# Patient Record
Sex: FEMALE | Race: WHITE | Employment: OTHER | URBAN - NONMETROPOLITAN AREA
[De-identification: names, ages, dates, MRNs, and addresses within clinical notes are randomized per-mention and may not be internally consistent; named-entity substitution may affect disease eponyms.]

---

## 2020-01-16 ENCOUNTER — OFFICE VISIT (OUTPATIENT)
Dept: PRIMARY CARE CLINIC | Age: 75
End: 2020-01-16
Payer: MEDICARE

## 2020-01-16 VITALS
SYSTOLIC BLOOD PRESSURE: 118 MMHG | OXYGEN SATURATION: 92 % | BODY MASS INDEX: 55.32 KG/M2 | HEART RATE: 60 BPM | DIASTOLIC BLOOD PRESSURE: 74 MMHG | HEIGHT: 61 IN | RESPIRATION RATE: 18 BRPM | TEMPERATURE: 96.9 F | WEIGHT: 293 LBS

## 2020-01-16 PROBLEM — I10 BENIGN ESSENTIAL HYPERTENSION: Status: ACTIVE | Noted: 2017-01-20

## 2020-01-16 PROBLEM — I25.10 CORONARY ARTERIOSCLEROSIS IN NATIVE ARTERY: Status: ACTIVE | Noted: 2017-01-20

## 2020-01-16 PROBLEM — E78.2 MIXED HYPERLIPIDEMIA: Status: ACTIVE | Noted: 2017-01-20

## 2020-01-16 PROBLEM — M15.9 GENERALIZED OSTEOARTHRITIS: Status: ACTIVE | Noted: 2017-12-22

## 2020-01-16 PROBLEM — F32.A DEPRESSION: Status: ACTIVE | Noted: 2020-01-16

## 2020-01-16 PROBLEM — G47.33 OBSTRUCTIVE SLEEP APNEA SYNDROME: Status: ACTIVE | Noted: 2017-01-20

## 2020-01-16 PROBLEM — J45.909 ASTHMA: Status: ACTIVE | Noted: 2017-01-20

## 2020-01-16 PROBLEM — N32.81 OVERACTIVE BLADDER: Status: ACTIVE | Noted: 2020-01-16

## 2020-01-16 PROBLEM — I48.91 ATRIAL FIBRILLATION (HCC): Status: ACTIVE | Noted: 2017-01-20

## 2020-01-16 PROCEDURE — 99203 OFFICE O/P NEW LOW 30 MIN: CPT | Performed by: FAMILY MEDICINE

## 2020-01-16 RX ORDER — ISOSORBIDE MONONITRATE 60 MG/1
TABLET, EXTENDED RELEASE ORAL
COMMUNITY
Start: 2019-12-19 | End: 2020-01-16 | Stop reason: SDUPTHER

## 2020-01-16 RX ORDER — SIMVASTATIN 10 MG
10 TABLET ORAL DAILY
Qty: 30 TABLET | Refills: 2 | Status: SHIPPED
Start: 2020-01-16 | End: 2020-04-08

## 2020-01-16 RX ORDER — SOLIFENACIN SUCCINATE 5 MG/1
TABLET, FILM COATED ORAL
COMMUNITY
Start: 2019-12-19 | End: 2020-01-16 | Stop reason: SDUPTHER

## 2020-01-16 RX ORDER — POTASSIUM CHLORIDE 750 MG/1
TABLET, FILM COATED, EXTENDED RELEASE ORAL
COMMUNITY
Start: 2019-12-19 | End: 2020-04-16 | Stop reason: SDUPTHER

## 2020-01-16 RX ORDER — SOLIFENACIN SUCCINATE 5 MG/1
5 TABLET, FILM COATED ORAL DAILY
Qty: 30 TABLET | Refills: 2 | Status: SHIPPED
Start: 2020-01-16 | End: 2020-06-05 | Stop reason: SDUPTHER

## 2020-01-16 RX ORDER — ALBUTEROL SULFATE 2.5 MG/3ML
3 SOLUTION RESPIRATORY (INHALATION)
COMMUNITY
End: 2020-07-21 | Stop reason: SDUPTHER

## 2020-01-16 RX ORDER — MONTELUKAST SODIUM 10 MG/1
TABLET ORAL
COMMUNITY
End: 2020-04-16 | Stop reason: SDUPTHER

## 2020-01-16 RX ORDER — CITALOPRAM 20 MG/1
TABLET ORAL
Qty: 30 TABLET | Refills: 2 | Status: SHIPPED | OUTPATIENT
Start: 2020-01-16 | End: 2020-04-16 | Stop reason: SDUPTHER

## 2020-01-16 RX ORDER — METOPROLOL TARTRATE 100 MG/1
TABLET ORAL
Qty: 60 TABLET | Refills: 2 | Status: SHIPPED | OUTPATIENT
Start: 2020-01-16 | End: 2020-06-05 | Stop reason: SDUPTHER

## 2020-01-16 RX ORDER — APIXABAN 5 MG/1
TABLET, FILM COATED ORAL
Qty: 60 TABLET | Refills: 2 | Status: CANCELLED | OUTPATIENT
Start: 2020-01-16

## 2020-01-16 RX ORDER — SIMVASTATIN 10 MG
TABLET ORAL
COMMUNITY
Start: 2019-12-19 | End: 2020-01-16 | Stop reason: SDUPTHER

## 2020-01-16 RX ORDER — ISOSORBIDE MONONITRATE 60 MG/1
60 TABLET, EXTENDED RELEASE ORAL DAILY
Qty: 30 TABLET | Refills: 2 | Status: SHIPPED
Start: 2020-01-16 | End: 2020-06-05 | Stop reason: SDUPTHER

## 2020-01-16 RX ORDER — APIXABAN 5 MG/1
TABLET, FILM COATED ORAL
COMMUNITY
Start: 2019-11-18 | End: 2020-07-21 | Stop reason: SDUPTHER

## 2020-01-16 RX ORDER — METOPROLOL TARTRATE 100 MG/1
TABLET ORAL
COMMUNITY
End: 2020-01-16 | Stop reason: SDUPTHER

## 2020-01-16 RX ORDER — CITALOPRAM 20 MG/1
TABLET ORAL
COMMUNITY
End: 2020-01-16 | Stop reason: SDUPTHER

## 2020-01-16 ASSESSMENT — PATIENT HEALTH QUESTIONNAIRE - PHQ9
SUM OF ALL RESPONSES TO PHQ9 QUESTIONS 1 & 2: 1
1. LITTLE INTEREST OR PLEASURE IN DOING THINGS: 0
SUM OF ALL RESPONSES TO PHQ QUESTIONS 1-9: 1
2. FEELING DOWN, DEPRESSED OR HOPELESS: 1
DEPRESSION UNABLE TO ASSESS: FUNCTIONAL CAPACITY MOTIVATION LIMITS ACCURACY
SUM OF ALL RESPONSES TO PHQ QUESTIONS 1-9: 1

## 2020-01-16 NOTE — PROGRESS NOTES
5 MG TABS tablet, TK 1 T PO BID, Disp: , Rfl:     montelukast (SINGULAIR) 10 MG tablet, montelukast 10 mg tablet  TAKE (1) TABLET BY MOUTH ONCE DAILY, Disp: , Rfl:     potassium chloride (KLOR-CON) 10 MEQ extended release tablet, , Disp: , Rfl:     isosorbide mononitrate (IMDUR) 60 MG extended release tablet, Take 1 tablet by mouth daily, Disp: 30 tablet, Rfl: 2    metoprolol (LOPRESSOR) 100 MG tablet, metoprolol tartrate 100 mg tablet  Take 1 tablet twice a day by oral route., Disp: 60 tablet, Rfl: 2    citalopram (CELEXA) 20 MG tablet, citalopram 20 mg tablet  TAKE 1 TABLET BY MOUTH EVERY DAY, Disp: 30 tablet, Rfl: 2    simvastatin (ZOCOR) 10 MG tablet, Take 1 tablet by mouth daily, Disp: 30 tablet, Rfl: 2    solifenacin (VESICARE) 5 MG tablet, Take 1 tablet by mouth daily, Disp: 30 tablet, Rfl: 2    Allergies   Allergen Reactions    Aspirin     Codeine        No past medical history on file. Social History     Socioeconomic History    Marital status:       Spouse name: Not on file    Number of children: Not on file    Years of education: Not on file    Highest education level: Not on file   Occupational History    Not on file   Social Needs    Financial resource strain: Not on file    Food insecurity:     Worry: Not on file     Inability: Not on file    Transportation needs:     Medical: Not on file     Non-medical: Not on file   Tobacco Use    Smoking status: Never Smoker    Smokeless tobacco: Never Used   Substance and Sexual Activity    Alcohol use: Not on file    Drug use: Not on file    Sexual activity: Not on file   Lifestyle    Physical activity:     Days per week: Not on file     Minutes per session: Not on file    Stress: Not on file   Relationships    Social connections:     Talks on phone: Not on file     Gets together: Not on file     Attends Gnosticist service: Not on file     Active member of club or organization: Not on file     Attends meetings of clubs or tablet; metoprolol tartrate 100 mg tablet   Take 1 tablet twice a day by oral route. -     citalopram (CELEXA) 20 MG tablet; citalopram 20 mg tablet   TAKE 1 TABLET BY MOUTH EVERY DAY  -     simvastatin (ZOCOR) 10 MG tablet; Take 1 tablet by mouth daily  -     solifenacin (VESICARE) 5 MG tablet; Take 1 tablet by mouth daily      Release of records. Return in about 3 months (around 4/16/2020) for [address], MEDICATION CHECK. Blank Akhtar MD    Note was generated with the assistance of voice recognition software. Document was reviewed however may contain grammatical errors.

## 2020-04-03 ENCOUNTER — TELEPHONE (OUTPATIENT)
Dept: PRIMARY CARE CLINIC | Age: 75
End: 2020-04-03

## 2020-04-03 NOTE — TELEPHONE ENCOUNTER
BECKAI . . . Patient has stopped taking Bydureon.       Electronically signed by Thomasenia Aschoff, LPN on 1/4/97 at 2:57 AM EDT

## 2020-04-08 RX ORDER — SIMVASTATIN 10 MG
10 TABLET ORAL DAILY
Qty: 30 TABLET | Refills: 10 | Status: SHIPPED
Start: 2020-04-08 | End: 2020-06-05 | Stop reason: SDUPTHER

## 2020-04-16 ENCOUNTER — VIRTUAL VISIT (OUTPATIENT)
Dept: PRIMARY CARE CLINIC | Age: 75
End: 2020-04-16
Payer: MEDICARE

## 2020-04-16 PROCEDURE — G2012 BRIEF CHECK IN BY MD/QHP: HCPCS | Performed by: FAMILY MEDICINE

## 2020-04-16 RX ORDER — CITALOPRAM 20 MG/1
TABLET ORAL
Qty: 30 TABLET | Refills: 2 | Status: SHIPPED | OUTPATIENT
Start: 2020-04-16 | End: 2020-06-05 | Stop reason: SDUPTHER

## 2020-04-16 RX ORDER — MONTELUKAST SODIUM 10 MG/1
TABLET ORAL
Qty: 30 TABLET | Refills: 2 | Status: SHIPPED | OUTPATIENT
Start: 2020-04-16 | End: 2020-06-05 | Stop reason: SDUPTHER

## 2020-04-16 RX ORDER — POTASSIUM CHLORIDE 750 MG/1
10 TABLET, FILM COATED, EXTENDED RELEASE ORAL DAILY
Qty: 60 TABLET | Refills: 2 | Status: SHIPPED
Start: 2020-04-16 | End: 2020-06-05

## 2020-04-16 NOTE — PROGRESS NOTES
Daniella Chaudhry is a 75 y.o. female evaluated via telephone on 4/16/2020. Consent:  She and/or health care decision maker is aware that that she may receive a bill for this telephone service, depending on her insurance coverage, and has provided verbal consent to proceed: Yes      Documentation:  I communicated with the patient and/or health care decision maker about hypertension, depression, hyperlipidemia. .   Details of this discussion including any medical advice provided: This 44-year-old female presents today for follow-up evaluation of her chronic medical problems including hypertension, depression and hyperlipidemia. The patient states c she has had a cough for 2 days. The patient states she was recently getting evaluated for her hearing aids and developed a cough. The patient denies any shortness of breath, chest pain or fever. Urrent medication list reviewed. The patient is tolerating all medications well without adverse events or known side effects. The patient is up-to-date on all age-appropriate wellness issues. Assessment #1 hypertension #2 hyperlipidemia #3 depression #4 cough #5 overactive bladder plan for 1 refill medications #2 schedule office visit follow-up in 3 months. #3 the patient was advised to seek care at a local fluid clinic if things progress. I affirm this is a Patient Initiated Episode with an Established Patient who has not had a related appointment within my department in the past 7 days or scheduled within the next 24 hours.     Total Time: minutes: 5-10 minutes    Note: not billable if this call serves to triage the patient into an appointment for the relevant concern      Scott De La O

## 2020-06-05 RX ORDER — CITALOPRAM 20 MG/1
TABLET ORAL
Qty: 30 TABLET | Refills: 2 | Status: SHIPPED | OUTPATIENT
Start: 2020-06-05 | End: 2020-07-21 | Stop reason: SDUPTHER

## 2020-06-05 RX ORDER — METOPROLOL TARTRATE 100 MG/1
TABLET ORAL
Qty: 60 TABLET | Refills: 2 | Status: SHIPPED | OUTPATIENT
Start: 2020-06-05 | End: 2020-07-21 | Stop reason: SDUPTHER

## 2020-06-05 RX ORDER — ISOSORBIDE MONONITRATE 60 MG/1
60 TABLET, EXTENDED RELEASE ORAL DAILY
Qty: 30 TABLET | Refills: 2 | Status: SHIPPED
Start: 2020-06-05 | End: 2020-07-21 | Stop reason: SDUPTHER

## 2020-06-05 RX ORDER — POTASSIUM CHLORIDE 750 MG/1
10 TABLET, FILM COATED, EXTENDED RELEASE ORAL DAILY
Qty: 60 TABLET | Refills: 2 | Status: SHIPPED
Start: 2020-06-05 | End: 2020-07-21 | Stop reason: SDUPTHER

## 2020-06-05 RX ORDER — SOLIFENACIN SUCCINATE 5 MG/1
5 TABLET, FILM COATED ORAL DAILY
Qty: 30 TABLET | Refills: 2 | Status: SHIPPED
Start: 2020-06-05 | End: 2020-06-09

## 2020-06-05 RX ORDER — MONTELUKAST SODIUM 10 MG/1
TABLET ORAL
Qty: 30 TABLET | Refills: 2 | Status: SHIPPED | OUTPATIENT
Start: 2020-06-05 | End: 2020-07-21 | Stop reason: SDUPTHER

## 2020-06-05 RX ORDER — SIMVASTATIN 10 MG
10 TABLET ORAL DAILY
Qty: 30 TABLET | Refills: 10 | Status: SHIPPED
Start: 2020-06-05 | End: 2020-07-21 | Stop reason: SDUPTHER

## 2020-06-09 RX ORDER — SOLIFENACIN SUCCINATE 5 MG/1
5 TABLET, FILM COATED ORAL DAILY
Qty: 30 TABLET | Refills: 10 | Status: SHIPPED
Start: 2020-06-09 | End: 2020-07-21 | Stop reason: SDUPTHER

## 2020-06-26 ENCOUNTER — TELEPHONE (OUTPATIENT)
Dept: ADMINISTRATIVE | Age: 75
End: 2020-06-26

## 2020-07-21 RX ORDER — APIXABAN 5 MG/1
TABLET, FILM COATED ORAL
Qty: 60 TABLET | Refills: 3 | Status: SHIPPED
Start: 2020-07-21 | End: 2020-11-02 | Stop reason: SDUPTHER

## 2020-07-21 RX ORDER — MONTELUKAST SODIUM 10 MG/1
TABLET ORAL
Qty: 30 TABLET | Refills: 2 | Status: SHIPPED | OUTPATIENT
Start: 2020-07-21 | End: 2020-07-24 | Stop reason: SDUPTHER

## 2020-07-21 RX ORDER — ISOSORBIDE MONONITRATE 60 MG/1
60 TABLET, EXTENDED RELEASE ORAL DAILY
Qty: 30 TABLET | Refills: 2 | Status: SHIPPED
Start: 2020-07-21 | End: 2020-11-02 | Stop reason: SDUPTHER

## 2020-07-21 RX ORDER — SIMVASTATIN 10 MG
10 TABLET ORAL DAILY
Qty: 30 TABLET | Refills: 10 | Status: SHIPPED
Start: 2020-07-21 | End: 2021-02-23 | Stop reason: SDUPTHER

## 2020-07-21 RX ORDER — METOPROLOL TARTRATE 100 MG/1
TABLET ORAL
Qty: 60 TABLET | Refills: 2 | Status: SHIPPED | OUTPATIENT
Start: 2020-07-21 | End: 2020-07-24 | Stop reason: SDUPTHER

## 2020-07-21 RX ORDER — SOLIFENACIN SUCCINATE 5 MG/1
5 TABLET, FILM COATED ORAL DAILY
Qty: 30 TABLET | Refills: 10 | Status: SHIPPED
Start: 2020-07-21 | End: 2021-02-23 | Stop reason: SDUPTHER

## 2020-07-21 RX ORDER — CITALOPRAM 20 MG/1
TABLET ORAL
Qty: 30 TABLET | Refills: 2 | Status: SHIPPED | OUTPATIENT
Start: 2020-07-21 | End: 2020-07-24 | Stop reason: SDUPTHER

## 2020-07-21 RX ORDER — ALBUTEROL SULFATE 2.5 MG/3ML
2.5 SOLUTION RESPIRATORY (INHALATION) EVERY 4 HOURS PRN
Qty: 120 EACH | Refills: 3 | Status: SHIPPED
Start: 2020-07-21 | End: 2020-11-04

## 2020-07-21 RX ORDER — POTASSIUM CHLORIDE 750 MG/1
10 TABLET, FILM COATED, EXTENDED RELEASE ORAL DAILY
Qty: 60 TABLET | Refills: 2 | Status: SHIPPED
Start: 2020-07-21 | End: 2021-02-23 | Stop reason: SDUPTHER

## 2020-07-22 ENCOUNTER — VIRTUAL VISIT (OUTPATIENT)
Dept: PRIMARY CARE CLINIC | Age: 75
End: 2020-07-22
Payer: MEDICARE

## 2020-07-22 PROCEDURE — G8400 PT W/DXA NO RESULTS DOC: HCPCS | Performed by: FAMILY MEDICINE

## 2020-07-22 PROCEDURE — 99214 OFFICE O/P EST MOD 30 MIN: CPT | Performed by: FAMILY MEDICINE

## 2020-07-22 PROCEDURE — 3017F COLORECTAL CA SCREEN DOC REV: CPT | Performed by: FAMILY MEDICINE

## 2020-07-22 PROCEDURE — 1090F PRES/ABSN URINE INCON ASSESS: CPT | Performed by: FAMILY MEDICINE

## 2020-07-22 PROCEDURE — G8427 DOCREV CUR MEDS BY ELIG CLIN: HCPCS | Performed by: FAMILY MEDICINE

## 2020-07-22 PROCEDURE — 1123F ACP DISCUSS/DSCN MKR DOCD: CPT | Performed by: FAMILY MEDICINE

## 2020-07-22 PROCEDURE — 4040F PNEUMOC VAC/ADMIN/RCVD: CPT | Performed by: FAMILY MEDICINE

## 2020-07-22 RX ORDER — METOLAZONE 5 MG/1
TABLET ORAL
COMMUNITY
Start: 2020-05-19

## 2020-07-22 NOTE — PROGRESS NOTES
2020    TELEHEALTH EVALUATION -- Audio/Visual (During  public health emergency)    HPI: This 65-year-old female presents today for follow-up evaluation of her chronic medical problems. Current medication list reviewed. The patient is tolerating all medications well without adverse events or known side effects. The patient is not up-to-date on all age-appropriate wellness issues. The patient states last month she fell at home injuring her posterior scalp region as well as her knee. Kristinnarda Goldstein (:  1945) has requested an audio/video evaluation for the following concern(s): Evaluation management of chronic medical problems. Review of Systems negative unless otherwise noted in HPI. Prior to Visit Medications    Medication Sig Taking? Authorizing Provider   ELIQUIS 5 MG TABS tablet TK 1 T PO BID Yes MARIAM Cronin MD   metoprolol (LOPRESSOR) 100 MG tablet metoprolol tartrate 100 mg tablet   Take 1 tablet twice a day by oral route.  Yes Aristeo Mccall MD   potassium chloride (KLOR-CON) 10 MEQ extended release tablet Take 1 tablet by mouth daily Yes Aristeo Mccall MD   solifenacin (VESICARE) 5 MG tablet Take 1 tablet by mouth daily Yes Aristeo Mccall MD   simvastatin (ZOCOR) 10 MG tablet Take 1 tablet by mouth daily Yes Aristeo Mccall MD   montelukast (SINGULAIR) 10 MG tablet montelukast 10 mg tablet   TAKE (1) TABLET BY MOUTH ONCE DAILY Yes MARIAM Cronin MD   isosorbide mononitrate (IMDUR) 60 MG extended release tablet Take 1 tablet by mouth daily Yes Aristeo Mccall MD   citalopram (CELEXA) 20 MG tablet citalopram 20 mg tablet   TAKE 1 TABLET BY MOUTH EVERY DAY Yes Aristeo Mccall MD   albuterol (PROVENTIL) (2.5 MG/3ML) 0.083% nebulizer solution Take 3 mLs by nebulization every 4 hours as needed for Wheezing Yes Aritseo Mcacll MD   metOLazone (ZAROXOLYN) 5 MG tablet   Historical Provider, MD       Social History     Tobacco Use    Smoking status: Never Smoker    Smokeless tobacco: Never Used   Substance Use Topics    Alcohol use: Not on file    Drug use: Not on file            PHYSICAL EXAMINATION:  [ INSTRUCTIONS:  \"[x]\" Indicates a positive item  \"[]\" Indicates a negative item  -- DELETE ALL ITEMS NOT EXAMINED]  Vital Signs: (As obtained by patient/caregiver or practitioner observation)    Blood pressure-  Heart rate-    Respiratory rate-    Temperature-  Pulse oximetry-     Constitutional: [x] Appears well-developed and well-nourished [x] No apparent distress      [] Abnormal-   Mental status  [x] Alert and awake  [x] Oriented to person/place/time [x]Able to follow commands      Eyes:  EOM    [x]  Normal  [] Abnormal-  Sclera  [x]  Normal  [] Abnormal -         Discharge [x]  None visible  [] Abnormal -    HENT:   [x] Normocephalic, atraumatic. [] Abnormal   [x] Mouth/Throat: Mucous membranes are moist.     External Ears [x] Normal  [] Abnormal-     Neck: [x] No visualized mass     Pulmonary/Chest: [x] Respiratory effort normal.  [x] No visualized signs of difficulty breathing or respiratory distress        [] Abnormal-      Musculoskeletal:   [] Normal gait with no signs of ataxia         [] Normal range of motion of neck        [] Abnormal-       Neurological:        [x] No Facial Asymmetry (Cranial nerve 7 motor function) (limited exam to video visit)          [x] No gaze palsy        [] Abnormal-         Skin:        [x] No significant exanthematous lesions or discoloration noted on facial skin         [] Abnormal-            Psychiatric:       [x] Normal Affect [x] No Hallucinations        [] Abnormal-     Other pertinent observable physical exam findings-     ASSESSMENT/PLAN: Assessment #1 hypertension #2  Depression #3 hyperlipidemia #4 LAD plan #1 continue current medical therapy #2 schedule office visit follow-up in 3 months. Return in about 3 months (around 10/22/2020) for MEDICATION CHECK, FOLLOW UP [address] Kristin Goldstein is a 75 y.o. female being evaluated by a Virtual Visit (video visit) encounter to address concerns as mentioned above. A caregiver was present when appropriate. Due to this being a TeleHealth encounter (During ZHB-90 public health emergency), evaluation of the following organ systems was limited: Vitals/Constitutional/EENT/Resp/CV/GI//MS/Neuro/Skin/Heme-Lymph-Imm. Pursuant to the emergency declaration under the [address] [place] [address], [address] [place] [address] and the [employer] and [employer] Act, this Virtual Visit was conducted with patient's (and/or legal guardian's) consent, to reduce the patient's risk of exposure to COVID-19 and provide necessary medical care. The patient (and/or legal guardian) has also been advised to contact this office for worsening conditions or problems, and seek emergency medical treatment and/or call 911 if deemed necessary. This encounter was conducted via [employer]. Patient identification was verified at the start of the visit: Yes    Total time spent on this encounter: 25 minutes. Services were provided through a video synchronous discussion virtually to substitute for in-person clinic visit. Patient and provider were located at their individual homes. --Ankita Roach MD on 7/22/2020 at 2:41 PM    An electronic signature was used to authenticate this note.

## 2020-07-24 RX ORDER — METOPROLOL TARTRATE 100 MG/1
TABLET ORAL
Qty: 60 TABLET | Refills: 2 | Status: SHIPPED | OUTPATIENT
Start: 2020-07-24 | End: 2020-07-29 | Stop reason: SDUPTHER

## 2020-07-24 RX ORDER — CITALOPRAM 20 MG/1
TABLET ORAL
Qty: 30 TABLET | Refills: 2 | Status: SHIPPED | OUTPATIENT
Start: 2020-07-24 | End: 2020-07-29 | Stop reason: SDUPTHER

## 2020-07-24 RX ORDER — MONTELUKAST SODIUM 10 MG/1
TABLET ORAL
Qty: 30 TABLET | Refills: 2 | Status: SHIPPED | OUTPATIENT
Start: 2020-07-24 | End: 2020-07-29 | Stop reason: SDUPTHER

## 2020-07-24 NOTE — TELEPHONE ENCOUNTER
Last Appointment:  1/16/2020  No future appointments. Patient needs pended med refilled.     Electronically signed by Monika Myers LPN on 7/32/8786 at 4:13 PM

## 2020-07-29 RX ORDER — MONTELUKAST SODIUM 10 MG/1
TABLET ORAL
Qty: 30 TABLET | Refills: 2 | Status: SHIPPED | OUTPATIENT
Start: 2020-07-29 | End: 2020-08-17 | Stop reason: SDUPTHER

## 2020-07-29 RX ORDER — METOPROLOL TARTRATE 100 MG/1
TABLET ORAL
Qty: 60 TABLET | Refills: 2 | Status: SHIPPED | OUTPATIENT
Start: 2020-07-29 | End: 2020-08-17 | Stop reason: SDUPTHER

## 2020-07-29 RX ORDER — CITALOPRAM 20 MG/1
TABLET ORAL
Qty: 30 TABLET | Refills: 2 | Status: SHIPPED | OUTPATIENT
Start: 2020-07-29 | End: 2021-02-23 | Stop reason: SDUPTHER

## 2020-07-30 ENCOUNTER — TELEPHONE (OUTPATIENT)
Dept: PRIMARY CARE CLINIC | Age: 75
End: 2020-07-30

## 2020-07-30 NOTE — TELEPHONE ENCOUNTER
Last Appointment:  1/16/2020  No future appointments. Patient left message to disregard the paperwork for her apartment at this time. Landlord will send over new paperwork to be completed.     Electronically signed by Nic Denton LPN on 3/00/4837 at 7:46 PM

## 2020-08-17 ENCOUNTER — OFFICE VISIT (OUTPATIENT)
Dept: PRIMARY CARE CLINIC | Age: 75
End: 2020-08-17
Payer: MEDICARE

## 2020-08-17 ENCOUNTER — HOSPITAL ENCOUNTER (OUTPATIENT)
Age: 75
Discharge: HOME | End: 2020-08-19
Payer: MEDICARE

## 2020-08-17 VITALS
OXYGEN SATURATION: 92 % | SYSTOLIC BLOOD PRESSURE: 136 MMHG | RESPIRATION RATE: 16 BRPM | HEART RATE: 69 BPM | BODY MASS INDEX: 55.32 KG/M2 | WEIGHT: 293 LBS | DIASTOLIC BLOOD PRESSURE: 80 MMHG | HEIGHT: 61 IN

## 2020-08-17 LAB
ALBUMIN SERPL-MCNC: 3.6 G/DL (ref 3.5–5.2)
ALP BLD-CCNC: 78 U/L (ref 35–104)
ALT SERPL-CCNC: 7 U/L (ref 0–32)
ANION GAP SERPL CALCULATED.3IONS-SCNC: 13 MMOL/L (ref 7–16)
AST SERPL-CCNC: 16 U/L (ref 0–31)
BILIRUB SERPL-MCNC: 0.3 MG/DL (ref 0–1.2)
BUN BLDV-MCNC: 20 MG/DL (ref 8–23)
CALCIUM SERPL-MCNC: 9.4 MG/DL (ref 8.6–10.2)
CHLORIDE BLD-SCNC: 98 MMOL/L (ref 98–107)
CHOLESTEROL, TOTAL: 162 MG/DL (ref 0–199)
CO2: 32 MMOL/L (ref 22–29)
CREAT SERPL-MCNC: 1.4 MG/DL (ref 0.5–1)
GFR AFRICAN AMERICAN: 44
GFR NON-AFRICAN AMERICAN: 37 ML/MIN/1.73
GLUCOSE BLD-MCNC: 103 MG/DL (ref 74–99)
HDLC SERPL-MCNC: 80 MG/DL
LDL CHOLESTEROL CALCULATED: 64 MG/DL (ref 0–99)
POTASSIUM SERPL-SCNC: 5 MMOL/L (ref 3.5–5)
SODIUM BLD-SCNC: 143 MMOL/L (ref 132–146)
TOTAL PROTEIN: 7 G/DL (ref 6.4–8.3)
TRIGL SERPL-MCNC: 91 MG/DL (ref 0–149)
VLDLC SERPL CALC-MCNC: 18 MG/DL

## 2020-08-17 PROCEDURE — 80053 COMPREHEN METABOLIC PANEL: CPT

## 2020-08-17 PROCEDURE — G8400 PT W/DXA NO RESULTS DOC: HCPCS | Performed by: FAMILY MEDICINE

## 2020-08-17 PROCEDURE — 3017F COLORECTAL CA SCREEN DOC REV: CPT | Performed by: FAMILY MEDICINE

## 2020-08-17 PROCEDURE — 99214 OFFICE O/P EST MOD 30 MIN: CPT | Performed by: FAMILY MEDICINE

## 2020-08-17 PROCEDURE — G8427 DOCREV CUR MEDS BY ELIG CLIN: HCPCS | Performed by: FAMILY MEDICINE

## 2020-08-17 PROCEDURE — 36415 COLL VENOUS BLD VENIPUNCTURE: CPT

## 2020-08-17 PROCEDURE — 1123F ACP DISCUSS/DSCN MKR DOCD: CPT | Performed by: FAMILY MEDICINE

## 2020-08-17 PROCEDURE — 4040F PNEUMOC VAC/ADMIN/RCVD: CPT | Performed by: FAMILY MEDICINE

## 2020-08-17 PROCEDURE — G8417 CALC BMI ABV UP PARAM F/U: HCPCS | Performed by: FAMILY MEDICINE

## 2020-08-17 PROCEDURE — 1036F TOBACCO NON-USER: CPT | Performed by: FAMILY MEDICINE

## 2020-08-17 PROCEDURE — 1090F PRES/ABSN URINE INCON ASSESS: CPT | Performed by: FAMILY MEDICINE

## 2020-08-17 PROCEDURE — 80061 LIPID PANEL: CPT

## 2020-08-17 RX ORDER — MONTELUKAST SODIUM 10 MG/1
TABLET ORAL
Qty: 90 TABLET | Refills: 2 | Status: SHIPPED | OUTPATIENT
Start: 2020-08-17 | End: 2021-02-23 | Stop reason: SDUPTHER

## 2020-08-17 RX ORDER — METOPROLOL TARTRATE 100 MG/1
TABLET ORAL
Qty: 60 TABLET | Refills: 2 | Status: SHIPPED | OUTPATIENT
Start: 2020-08-17 | End: 2020-11-02 | Stop reason: SDUPTHER

## 2020-08-17 NOTE — PROGRESS NOTES
2020     [facility]    : 1945 Sex: female   Age: 75 y.o. Chief Complaint   Patient presents with    Hypertension       HPI: This 75y.o. -year-old female  presents today for evaluation and management of her  chronic medical problems. Current medication list reviewed. The patient is tolerating all medications well without adverse events or known side effects. The patient does understand the risk and benefits of the prescribed medications. The patient is up-to-date on all age-appropriate wellness issues. ROS:   Const: Denies changes in appetite, chills, fever, night sweats and weight loss. Eyes:  Denies discharge, a recent change in visual acuity, blurred vision and double vision. ENMT: Denies discharge of the ears, hearing loss, pain of the ears. Denies nasal or sinus symptoms other than stated above. Denies mouth or throat symptoms. CV:  Denies chest pain, dyspnea on exertion, orthopnea, palpitations and PND  Resp: Denies chest pain, cough, SOB and wheezing. GI: Denies abdominal pain, constipation, diarrhea, heartburn, indigestion, nausea and vomiting. : Denies dysuria, frequency, hematuria, nocturia and urgency. Musculo: Denies arthralgias and myalgia  Skin:  Denies lesions, pruritus and rash. Neuro: Denies dizziness, lightheadedness, numbness, tingling and weakness. Psych:  Denies anxiety and depression  Endocrine: Denies anxiety and depression. Hema/Lymph: Denies hematologic symptoms  Allergy/Immuno:  Denies allergic/immunologic symptoms.   Pertinent positives reviewed and noted      Current Outpatient Medications:     montelukast (SINGULAIR) 10 MG tablet, montelukast 10 mg tablet  TAKE (1) TABLET BY MOUTH ONCE DAILY, Disp: 90 tablet, Rfl: 2    metoprolol (LOPRESSOR) 100 MG tablet, metoprolol tartrate 100 mg tablet  Take 1 tablet twice a day by oral route., Disp: 60 tablet, Rfl: 2    citalopram (CELEXA) 20 MG tablet, citalopram 20 mg tablet  TAKE 1 TABLET BY MOUTH EVERY partner violence     Fear of current or ex partner: Not on file     Emotionally abused: Not on file     Physically abused: Not on file     Forced sexual activity: Not on file   Other Topics Concern    Not on file   Social History Narrative    Not on file     History reviewed. No pertinent surgical history. History reviewed. No pertinent family history. Vitals:    08/17/20 1111   BP: 136/80   Pulse: 69   Resp: 16   SpO2: 92%   Weight: (!) 323 lb (146.5 kg)   Height: 5' 1\" (1.549 m)        Exam: Const: Appears healthy and well developed. No signs of acute distress present. Eyes: PERRL  ENMT: Tympanic membranes are intact. Nasal mucosa intact without noted erythema Septum is in the midline. Posterior pharynx shows no exudate, irritation or redness. Neck:  Supple without adenopathy. Adequate range of motion   Resp: No rales, rhonchi, wheezes appreciated over the lungs bilaterally. CV: S1, S2 within normal limits. Regular rate and rhythm noted. Without murmur, gallop or rub. Extremities:  Pulses intact. Without noted edema. Abdomen: Positive bowel sounds. Palpation reveals softness, with no distension, organomegaly or tenderness. No abdominal masses palpable. Skin: Skin is warm and dry. Musculo: Unchanged upon examination. Neuro: Alert and oriented X3. Cranial nerves grossly intact. Hearing aids intact. Psych: Mood is normal.  Affect is normal.   Vital signs reviewed. Controlled Substances Monitoring:     No flowsheet data found. Plan Per Assessment:  Delphine Pressley was seen today for hypertension. Diagnoses and all orders for this visit:    Benign essential hypertension  -     Comprehensive Metabolic Panel; Future  -     Lipid Panel; Future    Mixed hyperlipidemia  -     Lipid Panel; Future    Overactive bladder    Depression, unspecified depression type    Encounter for screening for malignant neoplasm of rectum  -     POCT Fecal Immunochemical Test (FIT);  Future    Other orders  - montelukast (SINGULAIR) 10 MG tablet; montelukast 10 mg tablet   TAKE (1) TABLET BY MOUTH ONCE DAILY  -     metoprolol (LOPRESSOR) 100 MG tablet; metoprolol tartrate 100 mg tablet   Take 1 tablet twice a day by oral route. Return in about 4 weeks (around 9/14/2020) for Wellness examination. Wu Ladd MD    Note was generated with the assistance of voice recognition software. Document was reviewed however may contain grammatical errors.

## 2020-08-18 ENCOUNTER — TELEPHONE (OUTPATIENT)
Dept: PRIMARY CARE CLINIC | Age: 75
End: 2020-08-18

## 2020-09-21 ENCOUNTER — TELEPHONE (OUTPATIENT)
Dept: PRIMARY CARE CLINIC | Age: 75
End: 2020-09-21

## 2020-11-02 RX ORDER — METOPROLOL TARTRATE 100 MG/1
TABLET ORAL
Qty: 60 TABLET | Refills: 5 | Status: SHIPPED | OUTPATIENT
Start: 2020-11-02 | End: 2021-02-10 | Stop reason: SDUPTHER

## 2020-11-02 RX ORDER — APIXABAN 5 MG/1
TABLET, FILM COATED ORAL
Qty: 60 TABLET | Refills: 5 | Status: SHIPPED
Start: 2020-11-02 | End: 2021-02-23 | Stop reason: SDUPTHER

## 2020-11-02 RX ORDER — ISOSORBIDE MONONITRATE 60 MG/1
60 TABLET, EXTENDED RELEASE ORAL DAILY
Qty: 30 TABLET | Refills: 5 | Status: SHIPPED
Start: 2020-11-02 | End: 2021-02-23 | Stop reason: SDUPTHER

## 2020-11-02 NOTE — TELEPHONE ENCOUNTER
Last Appointment:  8/17/2020  Future Appointments   Date Time Provider Waleska Frias   11/16/2020 11:30 AM Jyotsna Carr MD [facility]   11/16/2020 12:50 PM Jyotsna Carr MD [address] [place] [address]

## 2020-11-04 RX ORDER — ALBUTEROL SULFATE 2.5 MG/3ML
SOLUTION RESPIRATORY (INHALATION)
Qty: 360 ML | Refills: 10 | Status: SHIPPED | OUTPATIENT
Start: 2020-11-04

## 2020-11-16 ENCOUNTER — VIRTUAL VISIT (OUTPATIENT)
Dept: PRIMARY CARE CLINIC | Age: 75
End: 2020-11-16
Payer: MEDICARE

## 2020-11-16 PROCEDURE — 1123F ACP DISCUSS/DSCN MKR DOCD: CPT | Performed by: FAMILY MEDICINE

## 2020-11-16 PROCEDURE — 4040F PNEUMOC VAC/ADMIN/RCVD: CPT | Performed by: FAMILY MEDICINE

## 2020-11-16 PROCEDURE — 99213 OFFICE O/P EST LOW 20 MIN: CPT | Performed by: FAMILY MEDICINE

## 2020-11-16 PROCEDURE — G8400 PT W/DXA NO RESULTS DOC: HCPCS | Performed by: FAMILY MEDICINE

## 2020-11-16 PROCEDURE — G8427 DOCREV CUR MEDS BY ELIG CLIN: HCPCS | Performed by: FAMILY MEDICINE

## 2020-11-16 PROCEDURE — 1090F PRES/ABSN URINE INCON ASSESS: CPT | Performed by: FAMILY MEDICINE

## 2020-11-16 NOTE — PROGRESS NOTES
2020    TELEHEALTH EVALUATION -- Audio/Visual (During KIAWR-32 public health emergency)    HPI: This 50-year-old female presents today with a chief complaint of interscapular pain. The patient states the other night she experienced the pain. Patient states she was having some neck pain at the same time. The patient denied any chest pain, shortness of breath or lightheadedness/dizziness. Current medication list reviewed. The patient is tolerating all medications well without adverse events or known side effects. The patient is not up-to-date on all age-appropriate wellness issues. Denia Mathews (:  1945) has requested an audio/video evaluation for the following concern(s): Intrascapular pain. Review of Systems negative unless otherwise noted in HPI. Prior to Visit Medications    Medication Sig Taking? Authorizing Provider   albuterol (PROVENTIL) (2.5 MG/3ML) 0.083% nebulizer solution INHALE 3ML BY NEBULIZATION EVERY 4 HOURS AS NEEDED FOR WHEEZING  MARIAM Coulter MD   isosorbide mononitrate (IMDUR) 60 MG extended release tablet Take 1 tablet by mouth daily  Hardy Pang MD   ELIQUIS 5 MG TABS tablet TK 1 T PO BID  MARIAM Coulter MD   metoprolol (LOPRESSOR) 100 MG tablet metoprolol tartrate 100 mg tablet   Take 1 tablet twice a day by oral route.   Hardy Pang MD   montelukast (SINGULAIR) 10 MG tablet montelukast 10 mg tablet   TAKE (1) TABLET BY MOUTH ONCE DAILY  MARIAM Coulter MD   citalopram (CELEXA) 20 MG tablet citalopram 20 mg tablet   TAKE 1 TABLET BY MOUTH EVERY DAY  MARIAM Coulter MD   metOLazone (ZAROXOLYN) 5 MG tablet   Historical Provider, MD   potassium chloride (KLOR-CON) 10 MEQ extended release tablet Take 1 tablet by mouth daily  Hardy Pang MD   solifenacin (VESICARE) 5 MG tablet Take 1 tablet by mouth daily  Hardy Pang MD   simvastatin (ZOCOR) 10 MG tablet Take 1 tablet by mouth daily  Hardy Pang MD       Social History     Tobacco Use    Smoking status: Never Smoker    Smokeless tobacco: Never Used   Substance Use Topics    Alcohol use: Not on file    Drug use: Not on file            PHYSICAL EXAMINATION:  [ INSTRUCTIONS:  \"[x]\" Indicates a positive item  \"[]\" Indicates a negative item  -- DELETE ALL ITEMS NOT EXAMINED]  Vital Signs: (As obtained by patient/caregiver or practitioner observation)    Blood pressure-  Heart rate-    Respiratory rate-    Temperature-  Pulse oximetry-     Constitutional: [x] Appears well-developed and well-nourished [x] No apparent distress      [] Abnormal-   Mental status  [x] Alert and awake  [x] Oriented to person/place/time [x]Able to follow commands      Eyes:  EOM    [x]  Normal  [] Abnormal-  Sclera  [x]  Normal  [] Abnormal -         Discharge [x]  None visible  [] Abnormal -    HENT:   [x] Normocephalic, atraumatic. [] Abnormal   [x] Mouth/Throat: Mucous membranes are moist.     External Ears [x] Normal  [] Abnormal-     Neck: [x] No visualized mass     Pulmonary/Chest: [x] Respiratory effort normal.  [x] No visualized signs of difficulty breathing or respiratory distress        [] Abnormal-      Musculoskeletal:   [] Normal gait with no signs of ataxia         [] Normal range of motion of neck        [] Abnormal-       Neurological:        [x] No Facial Asymmetry (Cranial nerve 7 motor function) (limited exam to video visit)          [x] No gaze palsy        [] Abnormal-         Skin:        [x] No significant exanthematous lesions or discoloration noted on facial skin         [] Abnormal-            Psychiatric:       [x] Normal Affect [] No Hallucinations        [] Abnormal-     Other pertinent observable physical exam findings-     ASSESSMENT/PLAN: #1 scapular pain. Plan #1 continue current medical therapy #2 the patient was advised if any recurrence of pain to contact the office. #3 annual wellness visit in 3 weeks. Return in about 3 weeks (around 12/7/2020) for Annual wellness visit. Malgorzata Marx is a 67 y.o. female being evaluated by a Virtual Visit (video visit) encounter to address concerns as mentioned above. A caregiver was present when appropriate. Due to this being a TeleHealth encounter (During ZGGJU-52 public health emergency), evaluation of the following organ systems was limited: Vitals/Constitutional/EENT/Resp/CV/GI//MS/Neuro/Skin/Heme-Lymph-Imm. Pursuant to the emergency declaration under the [address] [place] [address], [address] [place] [address] authority and the [employer] Act, this Virtual Visit was conducted with patient's (and/or legal guardian's) consent, to reduce the patient's risk of exposure to COVID-19 and provide necessary medical care. The patient (and/or legal guardian) has also been advised to contact this office for worsening conditions or problems, and seek emergency medical treatment and/or call 911 if deemed necessary. This encounter was conducted via [employer]. Patient identification was verified at the start of the visit: Yes    Total time spent on this encounter: 15 minutes    Services were provided through a video synchronous discussion virtually to substitute for in-person clinic visit. Patient and provider were located at their individual homes. --Ld Andre MD on 11/16/2020 at 8:40 AM    An electronic signature was used to authenticate this note.

## 2020-11-20 LAB
CONTROL: NORMAL
HEMOCCULT STL QL: NORMAL

## 2020-11-20 PROCEDURE — 82274 ASSAY TEST FOR BLOOD FECAL: CPT | Performed by: FAMILY MEDICINE

## 2020-12-07 ENCOUNTER — VIRTUAL VISIT (OUTPATIENT)
Dept: PRIMARY CARE CLINIC | Age: 75
End: 2020-12-07
Payer: MEDICARE

## 2020-12-07 VITALS
HEART RATE: 99 BPM | BODY MASS INDEX: 55.32 KG/M2 | WEIGHT: 293 LBS | HEIGHT: 61 IN | DIASTOLIC BLOOD PRESSURE: 94 MMHG | SYSTOLIC BLOOD PRESSURE: 157 MMHG

## 2020-12-07 PROCEDURE — G0439 PPPS, SUBSEQ VISIT: HCPCS | Performed by: FAMILY MEDICINE

## 2020-12-07 PROCEDURE — G8484 FLU IMMUNIZE NO ADMIN: HCPCS | Performed by: FAMILY MEDICINE

## 2020-12-07 PROCEDURE — 4040F PNEUMOC VAC/ADMIN/RCVD: CPT | Performed by: FAMILY MEDICINE

## 2020-12-07 PROCEDURE — 1123F ACP DISCUSS/DSCN MKR DOCD: CPT | Performed by: FAMILY MEDICINE

## 2020-12-07 ASSESSMENT — PATIENT HEALTH QUESTIONNAIRE - PHQ9
8. MOVING OR SPEAKING SO SLOWLY THAT OTHER PEOPLE COULD HAVE NOTICED. OR THE OPPOSITE, BEING SO FIGETY OR RESTLESS THAT YOU HAVE BEEN MOVING AROUND A LOT MORE THAN USUAL: 0
4. FEELING TIRED OR HAVING LITTLE ENERGY: 1
SUM OF ALL RESPONSES TO PHQ QUESTIONS 1-9: 9
9. THOUGHTS THAT YOU WOULD BE BETTER OFF DEAD, OR OF HURTING YOURSELF: 0
5. POOR APPETITE OR OVEREATING: 2
7. TROUBLE CONCENTRATING ON THINGS, SUCH AS READING THE NEWSPAPER OR WATCHING TELEVISION: 0
3. TROUBLE FALLING OR STAYING ASLEEP: 2
SUM OF ALL RESPONSES TO PHQ9 QUESTIONS 1 & 2: 4
6. FEELING BAD ABOUT YOURSELF - OR THAT YOU ARE A FAILURE OR HAVE LET YOURSELF OR YOUR FAMILY DOWN: 0
10. IF YOU CHECKED OFF ANY PROBLEMS, HOW DIFFICULT HAVE THESE PROBLEMS MADE IT FOR YOU TO DO YOUR WORK, TAKE CARE OF THINGS AT HOME, OR GET ALONG WITH OTHER PEOPLE: 1
2. FEELING DOWN, DEPRESSED OR HOPELESS: 2
SUM OF ALL RESPONSES TO PHQ QUESTIONS 1-9: 9
SUM OF ALL RESPONSES TO PHQ QUESTIONS 1-9: 9
1. LITTLE INTEREST OR PLEASURE IN DOING THINGS: 2

## 2020-12-07 ASSESSMENT — LIFESTYLE VARIABLES: HOW OFTEN DO YOU HAVE A DRINK CONTAINING ALCOHOL: 0

## 2020-12-07 NOTE — PROGRESS NOTES
Medicare Annual Wellness Visit  Name: Jacobo Cedillo Date: 2020   MRN: <[id number]> Sex: Female   Age: 67 y.o. Ethnicity: Non-/Non    : 1945 Race: Sarah Fitzgerald is here for Medicare AWV and Hypertension    Screenings for behavioral, psychosocial and functional/safety risks, and cognitive dysfunction are all negative except as indicated below. These results, as well as other patient data from the [address] form, are documented in Flowsheets linked to this Encounter. Allergies   Allergen Reactions    Aspirin     Codeine          Prior to Visit Medications    Medication Sig Taking? Authorizing Provider   albuterol (PROVENTIL) (2.5 MG/3ML) 0.083% nebulizer solution INHALE 3ML BY NEBULIZATION EVERY 4 HOURS AS NEEDED FOR WHEEZING Yes Madonna Bazan MD   ELIQUIS 5 MG TABS tablet TK 1 T PO BID Yes MARIAM Guzman MD   metoprolol (LOPRESSOR) 100 MG tablet metoprolol tartrate 100 mg tablet   Take 1 tablet twice a day by oral route. Yes Madonna Bazan MD   montelukast (SINGULAIR) 10 MG tablet montelukast 10 mg tablet   TAKE (1) TABLET BY MOUTH ONCE DAILY Yes Madonna Bazan MD   citalopram (CELEXA) 20 MG tablet citalopram 20 mg tablet   TAKE 1 TABLET BY MOUTH EVERY DAY Yes Madonna Bazan MD   metOLazone (ZAROXOLYN) 5 MG tablet  Yes Historical Provider, MD   solifenacin (VESICARE) 5 MG tablet Take 1 tablet by mouth daily Yes Madonna Bazan MD   simvastatin (ZOCOR) 10 MG tablet Take 1 tablet by mouth daily Yes Madonna Bazan MD   isosorbide mononitrate (IMDUR) 60 MG extended release tablet Take 1 tablet by mouth daily  Madonna Bazan MD   potassium chloride (KLOR-CON) 10 MEQ extended release tablet Take 1 tablet by mouth daily  Madonna Bazan MD       History reviewed. No pertinent past medical history. History reviewed. No pertinent surgical history. History reviewed. No pertinent family history.     CareTeam (Including outside providers/suppliers 2 meals per day?: No  Have you seen a dentist within the past year?: (!) No  Body mass index: (!) 61.02  Health Habits/Nutrition Interventions:  · Mediterranean diet recommended. Hearing/Vision:  No exam data present  Hearing/Vision  Do you or your family notice any trouble with your hearing?: (!) Yes  Do you have difficulty driving, watching TV, or doing any of your daily activities because of your eyesight?: No  Have you had an eye exam within the past year?: Yes  Hearing/Vision Interventions:  · Hearing evaluation will be completed as the pandemic situation resolves. ADL:  ADLs  In the past 7 days, did you need help from others to perform any of the following everyday activities? Eating, dressing, grooming, bathing, toileting, or walking/balance?: (!) Dressing, Bathing, Grooming  In the past 7 days, did you need help from others to take care of any of the following? Laundry, housekeeping, banking/finances, shopping, telephone use, food preparation, transportation, or taking medications?: [employer], Housekeeping, Laundry  ADL Interventions:  · Continue assistance with ADLs and IADLs as needed. Personalized Preventive Plan   Current Health Maintenance Status    There is no immunization history on file for this patient. Health Maintenance   Topic Date Due    Shingles Vaccine (1 of 2) 08/30/1994    DEXA (modify frequency per FRAX score)  08/30/1999    Annual Wellness Visit (AWV)  01/16/2020    Flu vaccine (1) 09/01/2020    Lipid screen  08/17/2021    Potassium monitoring  08/17/2021    Creatinine monitoring  08/17/2021    DTaP/Tdap/Td vaccine (2 - Td) 05/05/2026    Pneumococcal 65+ years Vaccine  Completed    Hepatitis A vaccine  Aged Out    Hepatitis B vaccine  Aged Out    Hib vaccine  Aged Out    Meningococcal (ACWY) vaccine  Aged Out     Recommendations for Shoutlet Due: see orders and patient instructions/AVS.  .   Recommended screening schedule for the next 5-10 years is provided to the patient in written form: see Patient Vin Jones was seen today for medicare awv and hypertension.     Diagnoses and all orders for this visit:    Routine general medical examination at a health care facility

## 2021-01-06 ENCOUNTER — TELEPHONE (OUTPATIENT)
Dept: PRIMARY CARE CLINIC | Age: 76
End: 2021-01-06

## 2021-01-06 NOTE — TELEPHONE ENCOUNTER
Last Appointment:  12/7/2020  Future Appointments   Date Time Provider Waleska Frias   1/12/2021  1:30 PM Miki Hale MD [facility]   3/9/2021 10:30 AM Miki Hale MD [facility]   12/16/2021  8:20 AM MARIAM Aguilar MD [facility]      [place] from home care called to request verbal okay for PT/OT to continue 1-2 times week for strengthening, endurance, balance, and incontinence issues. Verbal okay given. Han also reports safety concerns. Both patient and son deny smoking with oxygen in the home. While nurse there son went into bathroom and when he entered the room he smelled of cigarette smoke. Also patient has gas stove in the home. Nurse advised her to not use stove while on oxygen. Patient states she tries not however at times she does.      Electronically signed by Chris Larson LPN on 0/4/0676 at 3:19 PM

## 2021-01-12 ENCOUNTER — VIRTUAL VISIT (OUTPATIENT)
Dept: PRIMARY CARE CLINIC | Age: 76
End: 2021-01-12
Payer: MEDICARE

## 2021-01-12 DIAGNOSIS — J44.9 CHRONIC OBSTRUCTIVE PULMONARY DISEASE, UNSPECIFIED COPD TYPE (HCC): ICD-10-CM

## 2021-01-12 DIAGNOSIS — R60.9 PERIPHERAL EDEMA: Primary | ICD-10-CM

## 2021-01-12 PROCEDURE — 1111F DSCHRG MED/CURRENT MED MERGE: CPT | Performed by: FAMILY MEDICINE

## 2021-01-12 PROCEDURE — 99495 TRANSJ CARE MGMT MOD F2F 14D: CPT | Performed by: FAMILY MEDICINE

## 2021-01-12 NOTE — PROGRESS NOTES
Post-Discharge Transitional Care Management Services or Hospital Follow Up      Jacque Clifton   YOB: 1945    Date of Office Visit:  1/12/2021  Date of Hospital Admission: 1/2/2021  Date of Hospital Discharge: 1/4/2021    Care management risk score Rising risk (score 2-5) and Complex Care (Scores >=6): 1     Non face to face  following discharge, date last encounter closed (first attempt may have been earlier): *No documented post hospital discharge outreach found in the last 14 days *No documented post hospital discharge outreach found in the last 14 days    Call initiated 2 business days of discharge: *No response recorded in the last 14 days    Patient Active Problem List   Diagnosis    Mixed hyperlipidemia    Obstructive sleep apnea syndrome    Generalized osteoarthritis    Coronary arteriosclerosis in native artery    Benign essential hypertension    Atrial fibrillation (HCC)    Asthma    Depression    Overactive bladder       Allergies   Allergen Reactions    Aspirin     Codeine        Medications listed as ordered at the time of discharge from hospital    Medications marked \"taking\" at this time  No outpatient medications have been marked as taking for the 1/12/21 encounter (Virtual Visit) with Fang Cano MD.        Medications patient taking as of now reconciled against medications ordered at time of hospital discharge: Yes    Chief Complaint   Patient presents with    Follow-Up from [facility] History of Present illness - Follow up of Hospital diagnosis(es): This 69-year-old female with a known history of COPD presented emergency room with peripheral edema as well as shortness of breath. The patient was admitted to the hospital.  The patient was seen in consultation by cardiology. The patient was discharged on Bumex 1 mg daily. The patient states her peripheral edema has greatly improved as she has lost 15 pounds of water weight. Patient does have a pending follow-up appoint with her cardiologist.  Chest x-ray did show mild cardiomegaly. Current medication list reviewed. The patient is tolerating all medications well without adverse events or known side effects. Inpatient course: Discharge summary reviewed- see chart. Interval history/Current status: At clinical baseline. There were no vitals filed for this visit. There is no height or weight on file to calculate BMI. Wt Readings from Last 3 Encounters:   12/07/20 (!) 323 lb (146.5 kg)   08/17/20 (!) 323 lb (146.5 kg)   01/16/20 (!) 323 lb (146.5 kg)     BP Readings from Last 3 Encounters:   12/07/20 (!) 157/94   08/17/20 136/80   01/16/20 118/74       A comprehensive review of systems was negative except for what was noted in the HPI. Assessment/Plan: #1 peripheral edema #2 exacerbation of COPD plan #1 continue current medical therapy #2 follow-up as scheduled on 3/9/2021 #4 the patient was advised to follow-up with cardiology as scheduled.         Medical Decision Making: high complexity

## 2021-02-01 ENCOUNTER — TELEPHONE (OUTPATIENT)
Dept: PRIMARY CARE CLINIC | Age: 76
End: 2021-02-01

## 2021-02-01 NOTE — TELEPHONE ENCOUNTER
Physical therapy called in asking if patient can have something written for pain. Currently on tylenol and its not helping.

## 2021-02-10 RX ORDER — BUMETANIDE 1 MG/1
1 TABLET ORAL DAILY
Qty: 30 TABLET | Refills: 5 | Status: SHIPPED
Start: 2021-02-10 | End: 2021-03-30

## 2021-02-10 RX ORDER — BUMETANIDE 1 MG/1
TABLET ORAL
COMMUNITY
Start: 2021-01-04 | End: 2021-02-10 | Stop reason: SDUPTHER

## 2021-02-10 RX ORDER — METOPROLOL TARTRATE 100 MG/1
TABLET ORAL
Qty: 60 TABLET | Refills: 5 | Status: SHIPPED | OUTPATIENT
Start: 2021-02-10 | End: 2021-02-23 | Stop reason: SDUPTHER

## 2021-02-23 ENCOUNTER — TELEPHONE (OUTPATIENT)
Dept: PRIMARY CARE CLINIC | Age: 76
End: 2021-02-23

## 2021-02-23 DIAGNOSIS — I25.10 CORONARY ARTERIOSCLEROSIS IN NATIVE ARTERY: Primary | ICD-10-CM

## 2021-02-23 DIAGNOSIS — R60.9 PERIPHERAL EDEMA: ICD-10-CM

## 2021-02-23 RX ORDER — APIXABAN 5 MG/1
TABLET, FILM COATED ORAL
Qty: 60 TABLET | Refills: 5 | Status: SHIPPED | OUTPATIENT
Start: 2021-02-23

## 2021-02-23 RX ORDER — SOLIFENACIN SUCCINATE 5 MG/1
5 TABLET, FILM COATED ORAL DAILY
Qty: 30 TABLET | Refills: 10 | Status: SHIPPED | OUTPATIENT
Start: 2021-02-23

## 2021-02-23 RX ORDER — POTASSIUM CHLORIDE 750 MG/1
10 TABLET, FILM COATED, EXTENDED RELEASE ORAL DAILY
Qty: 60 TABLET | Refills: 2 | Status: SHIPPED
Start: 2021-02-23 | End: 2021-03-03

## 2021-02-23 RX ORDER — ISOSORBIDE MONONITRATE 60 MG/1
60 TABLET, EXTENDED RELEASE ORAL DAILY
Qty: 30 TABLET | Refills: 5 | Status: SHIPPED | OUTPATIENT
Start: 2021-02-23 | End: 2021-03-25

## 2021-02-23 RX ORDER — CITALOPRAM 20 MG/1
TABLET ORAL
Qty: 30 TABLET | Refills: 2 | Status: SHIPPED | OUTPATIENT
Start: 2021-02-23

## 2021-02-23 RX ORDER — SIMVASTATIN 10 MG
10 TABLET ORAL DAILY
Qty: 30 TABLET | Refills: 10 | Status: SHIPPED
Start: 2021-02-23 | End: 2021-03-03

## 2021-02-23 RX ORDER — METOPROLOL TARTRATE 100 MG/1
TABLET ORAL
Qty: 60 TABLET | Refills: 5 | Status: SHIPPED | OUTPATIENT
Start: 2021-02-23 | End: 2021-04-23 | Stop reason: SDUPTHER

## 2021-02-23 RX ORDER — MONTELUKAST SODIUM 10 MG/1
TABLET ORAL
Qty: 90 TABLET | Refills: 2 | Status: SHIPPED | OUTPATIENT
Start: 2021-02-23

## 2021-03-03 RX ORDER — SIMVASTATIN 10 MG
TABLET ORAL
Qty: 30 TABLET | Refills: 10 | Status: SHIPPED | OUTPATIENT
Start: 2021-03-03

## 2021-03-03 RX ORDER — POTASSIUM CHLORIDE 750 MG/1
10 TABLET, FILM COATED, EXTENDED RELEASE ORAL DAILY
Qty: 30 TABLET | Refills: 10 | Status: SHIPPED | OUTPATIENT
Start: 2021-03-03 | End: 2021-04-02

## 2021-03-03 NOTE — TELEPHONE ENCOUNTER
Last Appointment:  1/12/2021  Future Appointments   Date Time Provider Waleska Frias   3/9/2021 10:30 AM Nuris Finney MD [facility]   12/16/2021  8:20 AM Nuris Finney MD [address] [place] [address]

## 2021-03-04 ENCOUNTER — TELEPHONE (OUTPATIENT)
Dept: PRIMARY CARE CLINIC | Age: 76
End: 2021-03-04

## 2021-03-04 NOTE — TELEPHONE ENCOUNTER
Physical therapist with [address] [place] [address] called stating she was supposed to d/c patient today but she was in the ER yesterday after her dog jumped on her and scratched her leg. They were unable to suture it and it is pretty deep. They would like nursing to see her for the wound. Is it ok to fax an order to them for nursing?     Fax [phone]

## 2021-03-05 ENCOUNTER — TELEPHONE (OUTPATIENT)
Dept: PRIMARY CARE CLINIC | Age: 76
End: 2021-03-05

## 2021-03-05 NOTE — TELEPHONE ENCOUNTER
Tell her not to take any meds where there is a potential interaction. If she is having significant abdominal pain have her schedule a virtual visit next week.

## 2021-03-05 NOTE — TELEPHONE ENCOUNTER
Bryan Chaparro from [facility] called stated she was going to discharge her but since they added the Nursing because of the wound that she will be discharging her next week instead. Darroll Crigler stated that patient told her that she wasn't feeling good last weekend so she was taking Pepto bismol and antiacids. Bryan Chaparro stated she does not believe she is taking them often but advised patient to NOT take Pepto with Eliquis since there is an Level 2 Interaction with them medications. She also would like permission to add them to her medication list.      Gave verbal ok to add to medication list.    Told her until we hear differently.       Bryan Chaparro stated any questions or instructions she can be reached at [phone]

## 2021-03-30 ENCOUNTER — OFFICE VISIT (OUTPATIENT)
Dept: PRIMARY CARE CLINIC | Age: 76
End: 2021-03-30
Payer: MEDICARE

## 2021-03-30 VITALS
HEART RATE: 71 BPM | DIASTOLIC BLOOD PRESSURE: 84 MMHG | SYSTOLIC BLOOD PRESSURE: 142 MMHG | RESPIRATION RATE: 16 BRPM | HEIGHT: 61 IN | BODY MASS INDEX: 61.03 KG/M2 | OXYGEN SATURATION: 94 %

## 2021-03-30 DIAGNOSIS — I48.91 ATRIAL FIBRILLATION, UNSPECIFIED TYPE (HCC): Primary | ICD-10-CM

## 2021-03-30 PROCEDURE — 99214 OFFICE O/P EST MOD 30 MIN: CPT | Performed by: FAMILY MEDICINE

## 2021-03-30 PROCEDURE — G8484 FLU IMMUNIZE NO ADMIN: HCPCS | Performed by: FAMILY MEDICINE

## 2021-03-30 PROCEDURE — 1090F PRES/ABSN URINE INCON ASSESS: CPT | Performed by: FAMILY MEDICINE

## 2021-03-30 PROCEDURE — G8400 PT W/DXA NO RESULTS DOC: HCPCS | Performed by: FAMILY MEDICINE

## 2021-03-30 PROCEDURE — G8417 CALC BMI ABV UP PARAM F/U: HCPCS | Performed by: FAMILY MEDICINE

## 2021-03-30 PROCEDURE — 1111F DSCHRG MED/CURRENT MED MERGE: CPT | Performed by: FAMILY MEDICINE

## 2021-03-30 PROCEDURE — G8427 DOCREV CUR MEDS BY ELIG CLIN: HCPCS | Performed by: FAMILY MEDICINE

## 2021-03-30 PROCEDURE — 1123F ACP DISCUSS/DSCN MKR DOCD: CPT | Performed by: FAMILY MEDICINE

## 2021-03-30 PROCEDURE — 1036F TOBACCO NON-USER: CPT | Performed by: FAMILY MEDICINE

## 2021-03-30 PROCEDURE — 4040F PNEUMOC VAC/ADMIN/RCVD: CPT | Performed by: FAMILY MEDICINE

## 2021-03-30 NOTE — PROGRESS NOTES
Post-Discharge Transitional Care Management Services or Hospital Follow Up      Kelly Vivas   YOB: 1945    Date of Office Visit:  3/30/2021  Date of Hospital Admission: 3/9/2021  Date of Hospital Discharge: 3/15/2021    Care management risk score Rising risk (score 2-5) and Complex Care (Scores >=6): 2     Non face to face  following discharge, date last encounter closed (first attempt may have been earlier): *No documented post hospital discharge outreach found in the last 14 days *No documented post hospital discharge outreach found in the last 14 days    Call initiated 2 business days of discharge: *No response recorded in the last 14 days    Patient Active Problem List   Diagnosis    Mixed hyperlipidemia    Obstructive sleep apnea syndrome    Generalized osteoarthritis    Coronary arteriosclerosis in native artery    Benign essential hypertension    Atrial fibrillation ([facility] [address]    Asthma    Depression    Overactive bladder       Allergies   Allergen Reactions    Aspirin     Codeine        Medications listed as ordered at the time of discharge from hospital      Medications marked \"taking\" at this time  Outpatient Medications Marked as Taking for the 3/30/21 encounter (Office Visit) with Lenard Shelton MD   Medication Sig Dispense Refill    simvastatin (ZOCOR) 10 MG tablet TAKE 1 TABLET BY MOUTH ONCE DAILY 30 tablet 10    potassium chloride (KLOR-CON) 10 MEQ extended release tablet TAKE 1 TABLET BY MOUTH DAILY 30 tablet 10    citalopram (CELEXA) 20 MG tablet citalopram 20 mg tablet   TAKE 1 TABLET BY MOUTH EVERY DAY 30 tablet 2    ELIQUIS 5 MG TABS tablet TK 1 T PO BID 60 tablet 5    montelukast (SINGULAIR) 10 MG tablet montelukast 10 mg tablet   TAKE (1) TABLET BY MOUTH ONCE DAILY 90 tablet 2    solifenacin (VESICARE) 5 MG tablet Take 1 tablet by mouth daily 30 tablet 10    metoprolol (LOPRESSOR) 100 MG tablet metoprolol tartrate 100 mg tablet   Take 1 tablet twice a day by oral route. 60 tablet 5    albuterol (PROVENTIL) (2.5 MG/3ML) 0.083% nebulizer solution INHALE 3ML BY NEBULIZATION EVERY 4 HOURS AS NEEDED FOR WHEEZING 360 mL 10    metOLazone (ZAROXOLYN) 5 MG tablet           Medications patient taking as of now reconciled against medications ordered at time of hospital discharge: Yes    Chief Complaint   Patient presents with    Hypertension       History of Present illness - Follow up of Hospital diagnosis(es): This 67-year-old female presents today from a recent hospitalization. The patient presented emergency room on March 9 with exacerbation of atrial fibrillation. The patient states she was seen in consultation by her cardiologist and has a pending appointment. The patient was also treated for a cellulitis of the left lower extremity. The patient states she is at her clinical baseline. Current medication list reviewed. The patient is tolerating all medications well without adverse events or known side effects. The patient is not up-to-date on all age-appropriate wellness issues. Inpatient course: Discharge summary reviewed- see chart. Interval history/Current status: At clinical baseline. Vitals:    03/30/21 1401 03/30/21 1407   BP: (!) 142/84 (!) 142/84   Pulse: 71    Resp: 16    SpO2: 94%    Height: 5' 1\" (1.549 m)      Body mass index is 61.03 kg/m². Wt Readings from Last 3 Encounters:   12/07/20 (!) 323 lb (146.5 kg)   08/17/20 (!) 323 lb (146.5 kg)   01/16/20 (!) 323 lb (146.5 kg)     BP Readings from Last 3 Encounters:   03/30/21 (!) 142/84   12/07/20 (!) 157/94   08/17/20 136/80       A comprehensive review of systems was negative except for what was noted in the HPI.     Physical Exam:  General Appearance: alert and oriented to person, place and time, well developed and well- nourished, in no acute distress  Skin: warm and dry, no rash or erythema  Head: normocephalic and atraumatic  Eyes: pupils equal, round, and reactive to light, extraocular eye movements intact, conjunctivae normal  ENT: tympanic membrane, external ear and ear canal normal bilaterally, nose without deformity, nasal mucosa and turbinates normal without polyps  Neck: supple and non-tender without mass, no thyromegaly or thyroid nodules, no cervical lymphadenopathy  Pulmonary/Chest: clear to auscultation bilaterally- no wheezes, rales or rhonchi, normal air movement, no respiratory distress  Cardiovascular: normal rate, regular rhythm, normal S1 and S2, no murmurs, rubs, clicks, or gallops, distal pulses intact, no carotid bruits  Abdomen: soft, non-tender, non-distended, normal bowel sounds, no masses or organomegaly  Extremities: Noted edema bilateral lower extremities. Musculoskeletal: Unchanged upon examination  Neurologic: reflexes normal and symmetric, no cranial nerve deficit, gait, coordination and speech normal    Assessment/Plan: #1 atrial fibrillation #2 cellulitis left lower extremity plan #1 continue current medical therapy #2 schedule office visit follow-up in 2 weeks.         Medical Decision Making: moderate complexity

## 2021-04-09 ENCOUNTER — TELEPHONE (OUTPATIENT)
Dept: PRIMARY CARE CLINIC | Age: 76
End: 2021-04-09

## 2021-04-09 NOTE — TELEPHONE ENCOUNTER
Nurse called to say that they will be leaving lower extremities open to air, there are only a few scabs now. Her weight is now at 312 and she is sob.  Her lungs sound clear

## 2021-04-13 ENCOUNTER — OFFICE VISIT (OUTPATIENT)
Dept: PRIMARY CARE CLINIC | Age: 76
End: 2021-04-13
Payer: MEDICARE

## 2021-04-13 VITALS
BODY MASS INDEX: 55.32 KG/M2 | HEIGHT: 61 IN | SYSTOLIC BLOOD PRESSURE: 128 MMHG | WEIGHT: 293 LBS | DIASTOLIC BLOOD PRESSURE: 74 MMHG | RESPIRATION RATE: 16 BRPM

## 2021-04-13 DIAGNOSIS — M15.9 GENERALIZED OSTEOARTHRITIS: Primary | ICD-10-CM

## 2021-04-13 DIAGNOSIS — E78.2 MIXED HYPERLIPIDEMIA: ICD-10-CM

## 2021-04-13 DIAGNOSIS — F32.A DEPRESSION, UNSPECIFIED DEPRESSION TYPE: ICD-10-CM

## 2021-04-13 DIAGNOSIS — I10 BENIGN ESSENTIAL HYPERTENSION: ICD-10-CM

## 2021-04-13 DIAGNOSIS — I48.91 ATRIAL FIBRILLATION, UNSPECIFIED TYPE (HCC): ICD-10-CM

## 2021-04-13 PROCEDURE — 4040F PNEUMOC VAC/ADMIN/RCVD: CPT | Performed by: FAMILY MEDICINE

## 2021-04-13 PROCEDURE — 1090F PRES/ABSN URINE INCON ASSESS: CPT | Performed by: FAMILY MEDICINE

## 2021-04-13 PROCEDURE — 1036F TOBACCO NON-USER: CPT | Performed by: FAMILY MEDICINE

## 2021-04-13 PROCEDURE — 1123F ACP DISCUSS/DSCN MKR DOCD: CPT | Performed by: FAMILY MEDICINE

## 2021-04-13 PROCEDURE — G8400 PT W/DXA NO RESULTS DOC: HCPCS | Performed by: FAMILY MEDICINE

## 2021-04-13 PROCEDURE — 99214 OFFICE O/P EST MOD 30 MIN: CPT | Performed by: FAMILY MEDICINE

## 2021-04-13 PROCEDURE — G8427 DOCREV CUR MEDS BY ELIG CLIN: HCPCS | Performed by: FAMILY MEDICINE

## 2021-04-13 PROCEDURE — G8417 CALC BMI ABV UP PARAM F/U: HCPCS | Performed by: FAMILY MEDICINE

## 2021-04-13 RX ORDER — BUMETANIDE 1 MG/1
TABLET ORAL
COMMUNITY
Start: 2021-04-07

## 2021-04-13 NOTE — PROGRESS NOTES
EVERY DAY, Disp: 30 tablet, Rfl: 2    ELIQUIS 5 MG TABS tablet, TK 1 T PO BID, Disp: 60 tablet, Rfl: 5    montelukast (SINGULAIR) 10 MG tablet, montelukast 10 mg tablet  TAKE (1) TABLET BY MOUTH ONCE DAILY, Disp: 90 tablet, Rfl: 2    solifenacin (VESICARE) 5 MG tablet, Take 1 tablet by mouth daily, Disp: 30 tablet, Rfl: 10    metoprolol (LOPRESSOR) 100 MG tablet, metoprolol tartrate 100 mg tablet  Take 1 tablet twice a day by oral route., Disp: 60 tablet, Rfl: 5    albuterol (PROVENTIL) (2.5 MG/3ML) 0.083% nebulizer solution, INHALE 3ML BY NEBULIZATION EVERY 4 HOURS AS NEEDED FOR WHEEZING, Disp: 360 mL, Rfl: 10    metOLazone (ZAROXOLYN) 5 MG tablet, , Disp: , Rfl:     potassium chloride (KLOR-CON) 10 MEQ extended release tablet, TAKE 1 TABLET BY MOUTH DAILY, Disp: 30 tablet, Rfl: 10    isosorbide mononitrate (IMDUR) 60 MG extended release tablet, Take 1 tablet by mouth daily, Disp: 30 tablet, Rfl: 5    Allergies   Allergen Reactions    Aspirin     Codeine        History reviewed. No pertinent past medical history. Social History     Socioeconomic History    Marital status:       Spouse name: Not on file    Number of children: Not on file    Years of education: Not on file    Highest education level: Not on file   Occupational History    Not on file   Social Needs    Financial resource strain: Not on file    Food insecurity     Worry: Not on file     Inability: Not on file    Transportation needs     Medical: Not on file     Non-medical: Not on file   Tobacco Use    Smoking status: Never Smoker    Smokeless tobacco: Never Used   Substance and Sexual Activity    Alcohol use: Not on file    Drug use: Not on file    Sexual activity: Not on file   Lifestyle    Physical activity     Days per week: Not on file     Minutes per session: Not on file    Stress: Not on file   Relationships    Social connections     Talks on phone: Not on file     Gets together: Not on file     Attends Sabianism service: Not on file     Active member of club or organization: Not on file     Attends meetings of clubs or organizations: Not on file     Relationship status: Not on file    Intimate partner violence     Fear of current or ex partner: Not on file     Emotionally abused: Not on file     Physically abused: Not on file     Forced sexual activity: Not on file   Other Topics Concern    Not on file   Social History Narrative    Not on file     History reviewed. No pertinent surgical history. History reviewed. No pertinent family history. Vitals:    04/13/21 1358   BP: (!) 328/74   Resp: 16   Weight: (!) 323 lb (146.5 kg)   Height: 5' 1\" (1.549 m)        Exam: Const: Appears healthy and well developed. No signs of acute distress present. Eyes: PERRL  ENMT: Tympanic membranes are intact. Nasal mucosa intact without noted erythema Septum is in the midline. Posterior pharynx shows no exudate, irritation or redness. Neck:  Supple without adenopathy. Adequate range of motion   Resp: No rales, rhonchi, wheezes appreciated over the lungs bilaterally. CV: S1, S2 within normal limits. Regular rate and rhythm noted. Without murmur, gallop or rub. Extremities:  Pulses intact. With noted edema. Abdomen: Positive bowel sounds. Palpation reveals softness, with no distension, organomegaly or tenderness. No abdominal masses palpable. Skin: Skin is warm and dry. Musculo: Unchanged upon examination. Neuro: Alert and oriented X3. Cranial nerves grossly intact. Psych: Mood is normal.  Affect is normal.   Vital signs reviewed. Controlled Substances Monitoring:     No flowsheet data found. Plan Per Assessment:  Marlys Cook was seen today for other.     Diagnoses and all orders for this visit:    Generalized osteoarthritis    Mixed hyperlipidemia    Depression, unspecified depression type    Benign essential hypertension    Atrial fibrillation, unspecified type ([facility] [address]        Return in about 3 months (around 7/13/2021) for MEDICATION CHECK, FOLLOW UP CHRONIC MEDICAL PROBLEMS. Edie Liriano MD    Note was generated with the assistance of voice recognition software. Document was reviewed however may contain grammatical errors.

## 2021-04-15 ENCOUNTER — TELEPHONE (OUTPATIENT)
Dept: PRIMARY CARE CLINIC | Age: 76
End: 2021-04-15

## 2021-04-15 LAB — SARS-COV-2: NORMAL

## 2021-04-15 NOTE — TELEPHONE ENCOUNTER
Alie from [facility] called to say that the patient is up about 12lb from Monday and has noted swelling in limbs and abdomen. She also has diminished lung sounds in left lung.     Cardio already notified       Robel Quintanilla advised that she should take pt to the hospital

## 2021-04-19 ENCOUNTER — TELEPHONE (OUTPATIENT)
Dept: PRIMARY CARE CLINIC | Age: 76
End: 2021-04-19

## 2021-04-19 DIAGNOSIS — M15.9 GENERALIZED OSTEOARTHRITIS: Primary | ICD-10-CM

## 2021-04-19 DIAGNOSIS — I48.91 ATRIAL FIBRILLATION, UNSPECIFIED TYPE (HCC): ICD-10-CM

## 2021-04-19 DIAGNOSIS — J45.20 MILD INTERMITTENT ASTHMA, UNSPECIFIED WHETHER COMPLICATED: ICD-10-CM

## 2021-04-20 ENCOUNTER — TELEPHONE (OUTPATIENT)
Dept: PRIMARY CARE CLINIC | Age: 76
End: 2021-04-20

## 2021-04-20 DIAGNOSIS — J45.901 ASTHMATIC BRONCHITIS WITH ACUTE EXACERBATION, UNSPECIFIED ASTHMA SEVERITY, UNSPECIFIED WHETHER PERSISTENT: Primary | ICD-10-CM

## 2021-04-20 NOTE — TELEPHONE ENCOUNTER
Last Appointment:  4/13/2021  Future Appointments   Date Time Provider Waleska Frias   4/23/2021  9:40 AM Minor Slater MD [facility]   7/14/2021  1:00 PM Minor Slater MD [facility]   12/16/2021  8:20 AM Minor Slater MD [facility]      Patient called she needs new nebulizer. Her's is not working. Pended order for your signature.     Electronically signed by Deeanna Ormond, LPN on 0/11/4749 at 8:90 AM

## 2021-04-23 ENCOUNTER — VIRTUAL VISIT (OUTPATIENT)
Dept: PRIMARY CARE CLINIC | Age: 76
End: 2021-04-23
Payer: MEDICARE

## 2021-04-23 DIAGNOSIS — R06.02 SHORTNESS OF BREATH: Primary | ICD-10-CM

## 2021-04-23 DIAGNOSIS — R09.81 NASAL CONGESTION: ICD-10-CM

## 2021-04-23 PROCEDURE — 4040F PNEUMOC VAC/ADMIN/RCVD: CPT | Performed by: FAMILY MEDICINE

## 2021-04-23 PROCEDURE — G8400 PT W/DXA NO RESULTS DOC: HCPCS | Performed by: FAMILY MEDICINE

## 2021-04-23 PROCEDURE — 99213 OFFICE O/P EST LOW 20 MIN: CPT | Performed by: FAMILY MEDICINE

## 2021-04-23 PROCEDURE — G8427 DOCREV CUR MEDS BY ELIG CLIN: HCPCS | Performed by: FAMILY MEDICINE

## 2021-04-23 PROCEDURE — 1123F ACP DISCUSS/DSCN MKR DOCD: CPT | Performed by: FAMILY MEDICINE

## 2021-04-23 PROCEDURE — 1090F PRES/ABSN URINE INCON ASSESS: CPT | Performed by: FAMILY MEDICINE

## 2021-04-23 RX ORDER — METOPROLOL TARTRATE 100 MG/1
TABLET ORAL
Qty: 60 TABLET | Refills: 5 | Status: SHIPPED | OUTPATIENT
Start: 2021-04-23

## 2021-04-23 RX ORDER — PREDNISONE 50 MG/1
TABLET ORAL
COMMUNITY
Start: 2021-04-20

## 2021-04-23 NOTE — PROGRESS NOTES
2021    TELEHEALTH EVALUATION -- Audio/Visual (During [id number] public health emergency)    HPI: This 59-year-old female presents today for a follow-up evaluation of a recent emergency room visit. The patient presented to the emergency room with nasal congestion and cough. The patient was discharged on prednisone therapy and states she is clinically improved. Current medication list reviewed. The patient is tolerating all medications well without adverse events or known side effects. The patient is not up-to-date on all age-appropriate wellness issues. Patient also presents today with a complaint of recent weight gain. The patient states she did consume a lot of water yesterday. Adrianna Roldan (:  1945) has requested an audio/video evaluation for the following concern(s): Follow-up evaluation. Review of Systems negative unless otherwise noted in HPI. Prior to Visit Medications    Medication Sig Taking? Authorizing Provider   predniSONE (DELTASONE) 50 MG tablet TAKE 1 TABLET BY MOUTH EVERY DAY Yes Historical Provider, MD   metoprolol (LOPRESSOR) 100 MG tablet metoprolol tartrate 100 mg tablet   Take 1 tablet twice a day by oral route.  Yes Jennifer Li MD   bumetanide (BUMEX) 1 MG tablet TAKE 1 TABLET BY MOUTH DAILY Yes Historical Provider, MD   simvastatin (ZOCOR) 10 MG tablet TAKE 1 TABLET BY MOUTH ONCE DAILY Yes Jennifer Li MD   citalopram (CELEXA) 20 MG tablet citalopram 20 mg tablet   TAKE 1 TABLET BY MOUTH EVERY DAY Yes Jennifer Li MD   ELIQUIS 5 MG TABS tablet TK 1 T PO BID Yes MARIAM Corona MD   montelukast (SINGULAIR) 10 MG tablet montelukast 10 mg tablet   TAKE (1) TABLET BY MOUTH ONCE DAILY Yes Jennifer Li MD   solifenacin (VESICARE) 5 MG tablet Take 1 tablet by mouth daily Yes Jennifer Li MD   albuterol (PROVENTIL) (2.5 MG/3ML) 0.083% nebulizer solution INHALE 3ML BY NEBULIZATION EVERY 4 HOURS AS NEEDED FOR WHEEZING Yes Jennifer Li MD   metOLazone (ZAROXOLYN) 5 MG tablet  Yes Historical Provider, MD   potassium chloride (KLOR-CON) 10 MEQ extended release tablet TAKE 1 TABLET BY MOUTH DAILY  MARIAM Ocampo MD   isosorbide mononitrate (IMDUR) 60 MG extended release tablet Take 1 tablet by mouth daily  Jeff Ackerman MD       Social History     Tobacco Use    Smoking status: Never Smoker    Smokeless tobacco: Never Used   Substance Use Topics    Alcohol use: Not on file    Drug use: Not on file            PHYSICAL EXAMINATION:  [ INSTRUCTIONS:  \"[x]\" Indicates a positive item  \"[]\" Indicates a negative item  -- DELETE ALL ITEMS NOT EXAMINED]  Vital Signs: (As obtained by patient/caregiver or practitioner observation)    Blood pressure-  Heart rate-    Respiratory rate-    Temperature-  Pulse oximetry-     Constitutional: [x] Appears well-developed and well-nourished [x] No apparent distress      [] Abnormal-   Mental status  [x] Alert and awake  [x] Oriented to person/place/time [x]Able to follow commands      Eyes:  EOM    [x]  Normal  [] Abnormal-  Sclera  [x]  Normal  [] Abnormal -         Discharge [x]  None visible  [] Abnormal -    HENT:   [x] Normocephalic, atraumatic.   [] Abnormal   [x] Mouth/Throat: Mucous membranes are moist.     External Ears [x] Normal  [] Abnormal-     Neck: [x] No visualized mass     Pulmonary/Chest: [x] Respiratory effort normal.  [x] No visualized signs of difficulty breathing or respiratory distress        [] Abnormal-      Musculoskeletal:   [] Normal gait with no signs of ataxia         [] Normal range of motion of neck        [] Abnormal-       Neurological:        [x] No Facial Asymmetry (Cranial nerve 7 motor function) (limited exam to video visit)          [x] No gaze palsy        [] Abnormal-         Skin:        [x] No significant exanthematous lesions or discoloration noted on facial skin         [] Abnormal-            Psychiatric:       [x] Normal Affect [] No Hallucinations        [] Abnormal-     Other pertinent observable physical exam findings-     ASSESSMENT/PLAN: #1 shortness of breath #2 nasal congestion plan #1 continue current medical therapy #2 refill Lopressor as requested #3 follow-up as scheduled on 7/14/2021. Limit fluid intake. Low-salt diet recommended. Return for [address] [place] [address]. Dominic Redd, was evaluated through a synchronous (real-time) audio-video encounter. The patient (or guardian if applicable) is aware that this is a billable service. Verbal consent to proceed has been obtained within the past 12 months. The visit was conducted pursuant to the emergency declaration under the [address] [place] [address], [address] [place] [address] authority and the [employer] and [email or web address] General Act. Patient identification was verified, and a caregiver was present when appropriate. The patient was located in a state where the provider was credentialed to provide care. This encounter was conducted via [employer] me    Total time spent on this encounter: 20 minutes. --Mile David MD on 4/23/2021 at 9:49 AM    An electronic signature was used to authenticate this note.

## 2021-04-26 ENCOUNTER — TELEPHONE (OUTPATIENT)
Dept: FAMILY MEDICINE CLINIC | Age: 76
End: 2021-04-26

## 2021-04-26 NOTE — TELEPHONE ENCOUNTER
Pt home health aide called in and wanted to let you know pt had a 9lb weight gain in one day.  Pt can be reached at home if you have any instructions or the home health can be reached at [phone]

## 2021-05-04 ENCOUNTER — TELEPHONE (OUTPATIENT)
Dept: PRIMARY CARE CLINIC | Age: 76
End: 2021-05-04

## 2021-05-04 NOTE — TELEPHONE ENCOUNTER
Pt states she is down to 304 lbs now and that no new symptoms pulse is 66 O2 95. No shortness of breath, no new symptoms.

## 2021-05-04 NOTE — TELEPHONE ENCOUNTER
Informed Dee Dee alfaro for skilled nursing and PT to restart in home for patient.     Electronically signed by Lacy Weiss LPN on 6/2/3057 at 4:50 PM

## 2021-05-04 NOTE — TELEPHONE ENCOUNTER
Last Appointment:  4/23/2021  Future Appointments   Date Time Provider Waleska Frias   5/11/2021  2:20 PM Zachary Sanchez MD [facility]   7/14/2021  1:00 PM Zachary Sanchez MD [facility]   12/16/2021  8:20 AM Zachary Sanchez MD [address] [place] [address] from [facility] is requesting orders for Skilled Nursing and PT. Patient was in hospital and they did not get new orders.     Electronically signed by Selin Hernandez LPN on 8/5/3949 at 4:21 PM      [phone] [facility] -

## 2021-05-11 ENCOUNTER — TELEPHONE (OUTPATIENT)
Dept: PRIMARY CARE CLINIC | Age: 76
End: 2021-05-11

## 2021-05-11 ENCOUNTER — OFFICE VISIT (OUTPATIENT)
Dept: PRIMARY CARE CLINIC | Age: 76
End: 2021-05-11
Payer: MEDICARE

## 2021-05-11 VITALS — SYSTOLIC BLOOD PRESSURE: 130 MMHG | DIASTOLIC BLOOD PRESSURE: 80 MMHG

## 2021-05-11 DIAGNOSIS — I48.91 ATRIAL FIBRILLATION, UNSPECIFIED TYPE (HCC): ICD-10-CM

## 2021-05-11 DIAGNOSIS — I25.10 CORONARY ARTERIOSCLEROSIS IN NATIVE ARTERY: Primary | ICD-10-CM

## 2021-05-11 DIAGNOSIS — M15.9 GENERALIZED OSTEOARTHRITIS: ICD-10-CM

## 2021-05-11 PROCEDURE — 1123F ACP DISCUSS/DSCN MKR DOCD: CPT | Performed by: FAMILY MEDICINE

## 2021-05-11 PROCEDURE — 1090F PRES/ABSN URINE INCON ASSESS: CPT | Performed by: FAMILY MEDICINE

## 2021-05-11 PROCEDURE — 1036F TOBACCO NON-USER: CPT | Performed by: FAMILY MEDICINE

## 2021-05-11 PROCEDURE — 4040F PNEUMOC VAC/ADMIN/RCVD: CPT | Performed by: FAMILY MEDICINE

## 2021-05-11 PROCEDURE — 99214 OFFICE O/P EST MOD 30 MIN: CPT | Performed by: FAMILY MEDICINE

## 2021-05-11 PROCEDURE — G8417 CALC BMI ABV UP PARAM F/U: HCPCS | Performed by: FAMILY MEDICINE

## 2021-05-11 PROCEDURE — G8400 PT W/DXA NO RESULTS DOC: HCPCS | Performed by: FAMILY MEDICINE

## 2021-05-11 PROCEDURE — G8427 DOCREV CUR MEDS BY ELIG CLIN: HCPCS | Performed by: FAMILY MEDICINE

## 2021-05-11 RX ORDER — SOTALOL HYDROCHLORIDE 80 MG/1
TABLET ORAL
COMMUNITY
Start: 2021-05-03

## 2021-05-11 RX ORDER — FLUCONAZOLE 100 MG/1
100 TABLET ORAL DAILY
Qty: 3 TABLET | Refills: 0 | Status: SHIPPED | OUTPATIENT
Start: 2021-05-11 | End: 2021-05-14

## 2021-05-11 RX ORDER — DILTIAZEM HYDROCHLORIDE 120 MG/1
CAPSULE, COATED, EXTENDED RELEASE ORAL
COMMUNITY
Start: 2021-05-03

## 2021-05-11 NOTE — PROGRESS NOTES
2021     [facility]    : 1945 Sex: female   Age: 67 y.o. Chief Complaint   Patient presents with    Other     mobility exam for hover round       HPI: This 67y.o. -year-old female  presents today specifically for a mobility examination . The patient has had issues with limited mobility since approximately . Clinical conditions limiting mobility include osteoarthropathy and cardiovascular disease. The patient does admit to bilateral knee osteoarthropathy. The patient states her symptoms have progressed since onset. The patient can walk minimally in her house with the use of a cane. The patient has used a Hoveround previously. The patient does require this mobility assistance in order to perform ADLs and IADLs. Current medication list reviewed. The patient is tolerating all medications well without adverse events or known side effects. The patient does understand the risk and benefits of the prescribed medications. The patient is not up-to-date on all age-appropriate wellness issues. ROS:   Const: Denies changes in appetite, chills, fever, night sweats and weight loss. Eyes:  Denies discharge, a recent change in visual acuity, blurred vision and double vision. ENMT: Denies discharge of the ears, hearing loss, pain of the ears. Denies nasal or sinus symptoms other than stated above. Denies mouth or throat symptoms. CV:  Denies chest pain, dyspnea on exertion, orthopnea, palpitations and PND  Resp: Denies chest pain, cough, SOB and wheezing. GI: Denies abdominal pain, constipation, diarrhea, heartburn, indigestion, nausea and vomiting. : Denies dysuria, frequency, hematuria, nocturia and urgency. Musculo: Denies arthralgias and myalgia  Skin:  Denies lesions, pruritus and rash. Neuro: Denies dizziness, lightheadedness, numbness, tingling and weakness.   Psych:  Denies anxiety and depression  Endocrine: Denies polyuria, polydipsia, polyphagia, weight gain, dry skin, constipation, fatigue, cold intolerance, heat intolerance or tremors. Hema/Lymph: Denies hematologic symptoms  Allergy/Immuno:  Denies allergic/immunologic symptoms. Pertinent positives reviewed and noted      Current Outpatient Medications:     dilTIAZem (CARDIZEM CD) 120 MG extended release capsule, TAKE 1 CAPSULE BY MOUTH EVERY DAY, Disp: , Rfl:     sotalol (BETAPACE) 80 MG tablet, TAKE 1 TABLET BY MOUTH EVERY 12 HOURS, Disp: , Rfl:     predniSONE (DELTASONE) 50 MG tablet, TAKE 1 TABLET BY MOUTH EVERY DAY, Disp: , Rfl:     metoprolol (LOPRESSOR) 100 MG tablet, metoprolol tartrate 100 mg tablet  Take 1 tablet twice a day by oral route., Disp: 60 tablet, Rfl: 5    bumetanide (BUMEX) 1 MG tablet, TAKE 1 TABLET BY MOUTH DAILY, Disp: , Rfl:     simvastatin (ZOCOR) 10 MG tablet, TAKE 1 TABLET BY MOUTH ONCE DAILY, Disp: 30 tablet, Rfl: 10    citalopram (CELEXA) 20 MG tablet, citalopram 20 mg tablet  TAKE 1 TABLET BY MOUTH EVERY DAY, Disp: 30 tablet, Rfl: 2    ELIQUIS 5 MG TABS tablet, TK 1 T PO BID, Disp: 60 tablet, Rfl: 5    montelukast (SINGULAIR) 10 MG tablet, montelukast 10 mg tablet  TAKE (1) TABLET BY MOUTH ONCE DAILY, Disp: 90 tablet, Rfl: 2    solifenacin (VESICARE) 5 MG tablet, Take 1 tablet by mouth daily, Disp: 30 tablet, Rfl: 10    albuterol (PROVENTIL) (2.5 MG/3ML) 0.083% nebulizer solution, INHALE 3ML BY NEBULIZATION EVERY 4 HOURS AS NEEDED FOR WHEEZING, Disp: 360 mL, Rfl: 10    metOLazone (ZAROXOLYN) 5 MG tablet, , Disp: , Rfl:     potassium chloride (KLOR-CON) 10 MEQ extended release tablet, TAKE 1 TABLET BY MOUTH DAILY, Disp: 30 tablet, Rfl: 10    isosorbide mononitrate (IMDUR) 60 MG extended release tablet, Take 1 tablet by mouth daily, Disp: 30 tablet, Rfl: 5    Allergies   Allergen Reactions    Aspirin     Codeine        History reviewed. No pertinent past medical history. Social History     Socioeconomic History    Marital status:       Spouse name: Not on file    Number of children: Not on file    Years of education: Not on file    Highest education level: Not on file   Occupational History    Not on file   Social Needs    Financial resource strain: Not on file    Food insecurity     Worry: Not on file     Inability: Not on file    Transportation needs     Medical: Not on file     Non-medical: Not on file   Tobacco Use    Smoking status: Never Smoker    Smokeless tobacco: Never Used   Substance and Sexual Activity    Alcohol use: Not on file    Drug use: Not on file    Sexual activity: Not on file   Lifestyle    Physical activity     Days per week: Not on file     Minutes per session: Not on file    Stress: Not on file   Relationships    Social connections     Talks on phone: Not on file     Gets together: Not on file     Attends [employer] service: Not on file     Active member of club or organization: Not on file     Attends meetings of clubs or organizations: Not on file     Relationship status: Not on file    Intimate partner violence     Fear of current or ex partner: Not on file     Emotionally abused: Not on file     Physically abused: Not on file     Forced sexual activity: Not on file   Other Topics Concern    Not on file   Social History Narrative    Not on file     History reviewed. No pertinent surgical history. History reviewed. No pertinent family history. Vitals:    05/11/21 1423   BP: 130/80        Exam: Const: Appears healthy and well developed. No signs of acute distress present. Eyes: PERRL  ENMT: Tympanic membranes are intact. Nasal mucosa intact without noted erythema Septum is in the midline. Posterior pharynx shows no exudate, irritation or redness. Neck:  Supple without adenopathy. Adequate range of motion   Resp: No rales, rhonchi, wheezes appreciated over the lungs bilaterally. CV: S1, S2 within normal limits. Regular rate and rhythm noted. Without murmur, gallop or rub. Extremities:  Pulses intact. With noted edema. Abdomen: Positive bowel sounds. Palpation reveals softness, with no distension, organomegaly or tenderness. No abdominal masses palpable. Skin: Skin is warm and dry. Musculo: Unchanged upon examination. Neuro: Alert and oriented X3. Cranial nerves grossly intact. Psych: Mood is normal.  Affect is normal.   Vital signs reviewed. Controlled Substances Monitoring:     No flowsheet data found. Plan Per Assessment:  Bing Caldwell was seen today for other. Diagnoses and all orders for this visit:    Coronary arteriosclerosis in native artery    Atrial fibrillation, unspecified type ([facility] [address]    Generalized osteoarthritis      Follow-up as scheduled. Return for [address] [place] [address]. Ish Reddy MD    Note was generated with the assistance of voice recognition software. Document was reviewed however may contain grammatical errors.

## 2021-05-11 NOTE — TELEPHONE ENCOUNTER
Karl Zaida just called and stated that she forgot to mention that when she urinates she is having vaginal itching. She was wondering if she could possibly having something called in for that. I told her I would call her back once I heard from you.

## 2021-05-13 ENCOUNTER — TELEPHONE (OUTPATIENT)
Dept: PRIMARY CARE CLINIC | Age: 76
End: 2021-05-13

## 2021-05-13 NOTE — TELEPHONE ENCOUNTER
Pt son called and stated that since him mom was released from hospital they told her she was no longer to take the benadryl. Patient sneezing, water eyes from allergies. Wanting to know what they can give her for allergies.     Fredy Wood- [phone]

## 2021-06-18 ENCOUNTER — TELEPHONE (OUTPATIENT)
Dept: PRIMARY CARE CLINIC | Age: 76
End: 2021-06-18

## 2021-06-18 NOTE — TELEPHONE ENCOUNTER
Last Appointment:  5/11/2021  No future appointments. Patient has been a patient of [facility]. Glendy Burroughs from [facility] called about outstanding orders under Dr. Andre Last - wants to know who will sign these. I explained patient needs to establish with new provider; however she wants to know who will sign those outstanding orders. Informed her I would ask. Please advise.     Electronically signed by Emelia Billings LPN on 8/70/3790 at 3:48 PM